# Patient Record
Sex: MALE | Race: WHITE | NOT HISPANIC OR LATINO | URBAN - METROPOLITAN AREA
[De-identification: names, ages, dates, MRNs, and addresses within clinical notes are randomized per-mention and may not be internally consistent; named-entity substitution may affect disease eponyms.]

---
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
## 2025-01-09 DIAGNOSIS — F41.1 GAD (GENERALIZED ANXIETY DISORDER): ICD-10-CM

## 2025-01-09 RX ORDER — PAROXETINE HYDROCHLORIDE 20 MG/1
20 TABLET, FILM COATED ORAL DAILY
Qty: 90 TABLET | Refills: 1 | OUTPATIENT
Start: 2025-01-09

## 2025-01-23 ENCOUNTER — TELEMEDICINE (OUTPATIENT)
Dept: BEHAVIORAL HEALTH | Facility: CLINIC | Age: 20
End: 2025-01-23
Payer: COMMERCIAL

## 2025-01-23 DIAGNOSIS — F41.1 GAD (GENERALIZED ANXIETY DISORDER): ICD-10-CM

## 2025-01-23 PROCEDURE — 99214 OFFICE O/P EST MOD 30 MIN: CPT | Performed by: MEDICAL GENETICS

## 2025-01-23 RX ORDER — PAROXETINE HYDROCHLORIDE 20 MG/1
20 TABLET, FILM COATED ORAL DAILY
Qty: 30 TABLET | Refills: 3 | Status: SHIPPED | OUTPATIENT
Start: 2025-01-23 | End: 2025-05-23

## 2025-01-23 NOTE — PROGRESS NOTES
Academics are looking really good: All As, 2 Bs. Made [employer] List and was really proud of that. Was worried about his microeconomics class but did well.  His classes are in marketing (got an A) and he's feeling more confident in the business classes (“more boring but their easy, and I've made a lot of friends in those classes.”)  Tries to be more engaged in those classes that he doesn't enjoy.  At the time we first spoke, he felt unsure about his college, and didn't feel that this was the school he wanted to be at. Now feels more acclimatized.   Thinks [employer] is now a better school option than [employer].  No longer endorses being unmotivated and stuck in his head, but feels occasional moments of unmotivation  Feels like his support system is better   “I've made a small group of friends”     No longer wishes to change major     He's enjoying marketing now     Taking Paxil daily: “I definitely think that's been helping”  Energy 'has been great.”  Mood is 'good pretty consistently'  Appetite:  “Good, I think I'm eating the best I have been recently” He's made an eating schedule with Dr. Salcedo  Sleep: “It's been pretty good, I don't have the best sleep schedule.”  Self-denigrating thoughts--have resolved   No longer passive death thoughts   Once more seeing Serene Ball for therapy (December 10 2024) for food and anxiety .        MSE  WM, cooperative, good eye contact, psychomotor WNL  Speech: normal rate, rhythm and tone  COT: no AH, VH, SI, HI  FOT: Seq  Mood: “Good”  Affect: Happy, full range     18 year old WM with anxiety/depression, not currently on medication, experiencing neurovegetative symptoms and anxiety.     Recommendations:  No side-effects on Paxil; continues to take consistently     Felt good on the medication and, “hasn't noticed anything bad about it.”     To continue medications at current dose. 1 month, three refills called in.

## 2025-02-10 ENCOUNTER — APPOINTMENT (OUTPATIENT)
Dept: BEHAVIORAL HEALTH | Facility: CLINIC | Age: 20
End: 2025-02-10
Payer: COMMERCIAL

## 2025-02-10 DIAGNOSIS — F41.1 GAD (GENERALIZED ANXIETY DISORDER): ICD-10-CM

## 2025-02-10 DIAGNOSIS — F40.10 SOCIAL ANXIETY DISORDER: ICD-10-CM

## 2025-02-10 DIAGNOSIS — F33.0 MILD EPISODE OF RECURRENT MAJOR DEPRESSIVE DISORDER (CMS-HCC): ICD-10-CM

## 2025-02-10 PROCEDURE — 90832 PSYTX W PT 30 MINUTES: CPT | Performed by: PSYCHOLOGIST

## 2025-02-10 NOTE — PROGRESS NOTES
SESSION INFORMATION  Person(s) interviewed: Stanton Mora  Date of service: 02/10/25  Start time: 3:30 PM  Stop Time: 3:55 PM  Length of session: 25 Minutes  Contact Type: Individual Therapy   Service Location:   Telehealth/Virtual.     Session Content  Writer met with Stanton. Stanton provided an update of symptoms and functioning and discussed current concerns.     Stanton attended a virtual follow up from his school campus room.     Doing better, feels he has adjusted more. Looking forward to next year, moving in with roommates into a large house.      Has been socially more outgoing, initiating more social interactions. Enjoys time with roommates. Has reportedly been able to keep up well with school work and is content with his achievement. Hung out with sister recently for dinner. Spent holidays back home with friends and family.     Mood 'good' and anxiety reportedly decreased and is well managed. No change in meds reported.     Provided CBT, Focused on managing anxiety, improving mood.     Plan  Writer will meet with Stanton as needed to monitor and improve symptoms of anxiety and depression.      Procedure  [id number]    Serene Salcedo, PhD  Clinical Psychologist

## 2025-08-12 DIAGNOSIS — F41.1 GAD (GENERALIZED ANXIETY DISORDER): ICD-10-CM

## 2025-08-12 RX ORDER — PAROXETINE 20 MG/1
20 TABLET, FILM COATED ORAL DAILY
Qty: 30 TABLET | Refills: 5 | Status: SHIPPED | OUTPATIENT
Start: 2025-08-12 | End: 2026-02-08